# Patient Record
Sex: FEMALE | Race: WHITE | NOT HISPANIC OR LATINO | Employment: OTHER | URBAN - METROPOLITAN AREA
[De-identification: names, ages, dates, MRNs, and addresses within clinical notes are randomized per-mention and may not be internally consistent; named-entity substitution may affect disease eponyms.]

---

## 2017-09-20 NOTE — PATIENT DISCUSSION
The IOP is borderline. Pt has long hx of OHTN. Followed by Dr. Nuha Portillo in past. Has never been rx'd IOP lowering meds. VF stable from last visit. Small c/d, healthy rim OU. Monitor.

## 2018-03-27 NOTE — PATIENT DISCUSSION
The IOP is borderline. Pt has long hx of OHTN. Followed by Dr. Marla Carty in past. Has never been rx'd IOP lowering meds. VF stable from last visit. Small c/d, healthy rim OU. Monitor.

## 2018-11-03 NOTE — PATIENT DISCUSSION
The IOP is borderline. Pt has long hx of OHTN. Followed by Dr. Jennifer Valdivia in past. Has never been rx'd IOP lowering meds. Small c/d, healthy rim OU. Monitor.

## 2019-11-11 NOTE — PATIENT DISCUSSION
[place] grid at home. MVI/AREDS discussed. Patient to call if any changes in vision or grid card.
Consult with [address] [place] [address] for SLT OU.  Patient is concerned about side affects of medicine.
Cont OTC antihistamine drops as needed.
Continue current management.
Discussed AREDS 2 supplements, UV protection and green leafy vegetables.
Discussed importance of compliance with ocular medications and follow up exams to prevent loss of vision.
Glasses prescription dispensed at patient request.
Good postoperative appearance.
Instructed to call immediately if any new distortion, blurring, decreased vision or eye pain.
Monitor IOP OU.
Monitor for increased risk K edema OU.
Monitor.
No retinal holes, tears, or detachment at this time.
OCT is normal ou.
Patient happy with [facility].
Patient informed at length on side affects of glaucoma meds.
Patient understands condition, prognosis and need for follow up care.
RD/tear warning symptoms reviewed. F&F brochure given. Call immediately if increase in floaters, flashes, veil, blur.
Reassured patient.
Recommended artificial tears to use: 1 drop 4x a day in both eyes.
Recommended observation.
The IOP is above the target range.
The IOP is in the target range.
The OCT is normal ou.
VF is suspicious for glaucoma OU.
negative

## 2019-11-19 NOTE — PATIENT DISCUSSION
Patient would like to hold on SLT OU and would like to start Latanoprost OU.  Trial Lumigan qhs ou then rx latanoprost qhs ou if iop lowers.

## 2021-11-08 ENCOUNTER — NEW PATIENT EMERGENCY (OUTPATIENT)
Dept: URBAN - METROPOLITAN AREA CLINIC 36 | Facility: CLINIC | Age: 61
End: 2021-11-08

## 2021-11-08 DIAGNOSIS — H02.051: ICD-10-CM

## 2021-11-08 DIAGNOSIS — H33.001: ICD-10-CM

## 2021-11-08 PROCEDURE — 92134 CPTRZ OPH DX IMG PST SGM RTA: CPT

## 2021-11-08 PROCEDURE — 92004 COMPRE OPH EXAM NEW PT 1/>: CPT

## 2021-11-08 ASSESSMENT — TONOMETRY
OD_IOP_MMHG: 16
OS_IOP_MMHG: 16

## 2021-11-08 ASSESSMENT — VISUAL ACUITY
OS_PH: 20/30+2
OD_PH: 20/25-2
OD_SC: 20/60-1
OS_SC: 20/50-2

## 2021-12-15 ENCOUNTER — COMPREHENSIVE EXAM (OUTPATIENT)
Dept: URBAN - METROPOLITAN AREA CLINIC 36 | Facility: CLINIC | Age: 61
End: 2021-12-15

## 2021-12-15 DIAGNOSIS — H25.812: ICD-10-CM

## 2021-12-15 DIAGNOSIS — H02.051: ICD-10-CM

## 2021-12-15 DIAGNOSIS — H52.7: ICD-10-CM

## 2021-12-15 DIAGNOSIS — H33.001: ICD-10-CM

## 2021-12-15 PROCEDURE — 92015 DETERMINE REFRACTIVE STATE: CPT

## 2021-12-15 PROCEDURE — 92012 INTRM OPH EXAM EST PATIENT: CPT

## 2021-12-15 ASSESSMENT — VISUAL ACUITY
OS_SC: J1
OD_PH: 20/25
OS_PH: 20/30+1
OD_SC: 20/60
OD_SC: J2
OS_SC: 20/50

## 2021-12-15 ASSESSMENT — TONOMETRY
OD_IOP_MMHG: 12
OS_IOP_MMHG: 12

## 2022-08-30 NOTE — PROCEDURE NOTE: CLINICAL

## 2022-09-09 NOTE — PATIENT DISCUSSION
[facility]  Dermatology  [phone]    WOUND CARE INSTRUCTIONS      Begin in 24 - 48 hours    1. Cleanse wound gently with tap water and Q-tip once daily, or wash when showering/washing face using fingertips to gently cleanse area.     If there is drainage, remove as much as possible.   2. Apply Vaseline-do not let the wound dry out.  Reapply Vaseline as needed.  DO NOT LET THE WOUND DRY OUT. Do NOT let the wound scab over.   3.  Cover the wound with a clean Band-Aid or non-stick dressing, unless instructed differently.    4.  If there is any oozing or bleeding, apply firm pressure for 10-20 minutes or more. Do NOT stop holding pressure or peek under the gauze while holding pressure. Holding pressure for a solid 10-20 minutes will help form a clot to control bleeding. If you lift up too soon you can disrupt the clot forming process and start oozing again.    5. In about 10-14 days (sometimes sooner or later) you will see a sticky yellowish-whitish substance begin forming in the wound. THIS IS A NORMAL PART OF HEALING.  This is part of the inflammatory healing process and is cells and other substances the body is sending to start to close the wound.  Continue to wash the wound as normal and cover with Vaseline and a bandage.  Red dispersed areas will typically begin to appear throughout the wound base as the tissue begins to fill in from the bottom up and the outsides in.  Continue applying Vaseline and a bandage daily and DO NOT LET THE WOUND DRY OUT. Do NOT let a scab form - scabs will delay healing, can cause bleeding and more scarring.     You may shower, however reapply vaseline and bandaid.      You will be notified of your biopsy within 7-10 days.  If it has been greater then 10 days and you have not been contacted please call [phone].      Recommended topical antihistamine.

## 2022-10-11 NOTE — PROCEDURE NOTE: CLINICAL

## 2022-12-06 NOTE — PROCEDURE NOTE: CLINICAL

## 2024-08-08 ENCOUNTER — COMPREHENSIVE EXAM (OUTPATIENT)
Dept: URBAN - METROPOLITAN AREA CLINIC 43 | Facility: CLINIC | Age: 64
End: 2024-08-08

## 2024-08-08 DIAGNOSIS — H33.001: ICD-10-CM

## 2024-08-08 DIAGNOSIS — H25.812: ICD-10-CM

## 2024-08-08 DIAGNOSIS — H52.7: ICD-10-CM

## 2024-08-08 DIAGNOSIS — H01.02B: ICD-10-CM

## 2024-08-08 DIAGNOSIS — H01.02A: ICD-10-CM

## 2024-08-08 DIAGNOSIS — H04.123: ICD-10-CM

## 2024-08-08 PROCEDURE — 99214 OFFICE O/P EST MOD 30 MIN: CPT

## 2024-08-08 PROCEDURE — 92015 DETERMINE REFRACTIVE STATE: CPT

## 2024-08-08 ASSESSMENT — TONOMETRY
OS_IOP_MMHG: 16
OD_IOP_MMHG: 16

## 2024-08-08 ASSESSMENT — VISUAL ACUITY
OU_SC: J1
OD_SC: 20/30-2
OS_SC: 20/60-2
OS_SC: J1
OU_SC: 20/40-2
OS_BAT: >20/400
OD_SC: J10-

## 2024-08-13 ENCOUNTER — CONSULTATION/EVALUATION (OUTPATIENT)
Dept: URBAN - METROPOLITAN AREA CLINIC 39 | Facility: CLINIC | Age: 64
End: 2024-08-13

## 2024-08-13 DIAGNOSIS — H01.02B: ICD-10-CM

## 2024-08-13 DIAGNOSIS — H01.02A: ICD-10-CM

## 2024-08-13 DIAGNOSIS — H04.123: ICD-10-CM

## 2024-08-13 DIAGNOSIS — Z96.1: ICD-10-CM

## 2024-08-13 DIAGNOSIS — H25.812: ICD-10-CM

## 2024-08-13 DIAGNOSIS — H35.373: ICD-10-CM

## 2024-08-13 PROCEDURE — 92134 CPTRZ OPH DX IMG PST SGM RTA: CPT

## 2024-08-13 PROCEDURE — 92286 ANT SGM IMG I&R SPECLR MIC: CPT

## 2024-08-13 PROCEDURE — 92136 OPHTHALMIC BIOMETRY: CPT

## 2024-08-13 PROCEDURE — 92025CV CORNEAL TOPOGRAPHY, CV

## 2024-08-13 PROCEDURE — 99204 OFFICE O/P NEW MOD 45 MIN: CPT

## 2024-08-13 PROCEDURE — V2799PMN IMPRIMIS PRED-MOXI-NEPAF 5ML

## 2024-08-13 ASSESSMENT — VISUAL ACUITY
OU_SC: J1
OS_SC: 20/100
OD_SC: 20/20-1
OD_SC: J6
OS_SC: J2
OS_BAT: 20/400

## 2024-08-13 ASSESSMENT — TONOMETRY
OS_IOP_MMHG: 15
OD_IOP_MMHG: 13

## 2024-09-25 ENCOUNTER — PRE-OP/H&P (OUTPATIENT)
Dept: URBAN - METROPOLITAN AREA CLINIC 39 | Facility: CLINIC | Age: 64
End: 2024-09-25

## 2024-09-25 ENCOUNTER — TECH ONLY (OUTPATIENT)
Dept: URBAN - METROPOLITAN AREA CLINIC 39 | Facility: CLINIC | Age: 64
End: 2024-09-25

## 2024-09-25 ENCOUNTER — SURGERY/PROCEDURE (OUTPATIENT)
Facility: LOCATION | Age: 64
End: 2024-09-25

## 2024-09-25 DIAGNOSIS — H01.02B: ICD-10-CM

## 2024-09-25 DIAGNOSIS — H25.812: ICD-10-CM

## 2024-09-25 DIAGNOSIS — Z96.1: ICD-10-CM

## 2024-09-25 DIAGNOSIS — H04.123: ICD-10-CM

## 2024-09-25 DIAGNOSIS — H01.02A: ICD-10-CM

## 2024-09-25 DIAGNOSIS — H35.373: ICD-10-CM

## 2024-09-25 PROCEDURE — 65772LRI LRI DURING CAT SX

## 2024-09-25 PROCEDURE — 66999LNSR LENSAR LASER FOR CAT SX

## 2024-09-25 PROCEDURE — 99199PCV PROF CUSTOM VISION PACKAGE

## 2024-09-25 PROCEDURE — 99211HP PRE-OP

## 2024-09-25 PROCEDURE — 66984CV REMOVE CATARACT, INSERT LENS, CUSTOM VISION

## 2024-09-25 PROCEDURE — 99211T TECH SERVICE

## 2024-09-27 ENCOUNTER — POST-OP (OUTPATIENT)
Dept: URBAN - METROPOLITAN AREA CLINIC 43 | Facility: CLINIC | Age: 64
End: 2024-09-27

## 2024-09-27 DIAGNOSIS — Z96.1: ICD-10-CM

## 2024-09-27 PROCEDURE — 99024 POSTOP FOLLOW-UP VISIT: CPT

## 2024-10-24 ENCOUNTER — POST-OP (OUTPATIENT)
Dept: URBAN - METROPOLITAN AREA CLINIC 43 | Facility: CLINIC | Age: 64
End: 2024-10-24

## 2024-10-24 DIAGNOSIS — Z96.1: ICD-10-CM

## 2024-10-24 PROCEDURE — 99024 POSTOP FOLLOW-UP VISIT: CPT | Mod: 55

## 2024-12-25 ENCOUNTER — OFFICE VISIT (OUTPATIENT)
Dept: URGENT CARE | Age: 64
End: 2024-12-25
Payer: COMMERCIAL

## 2024-12-25 VITALS
SYSTOLIC BLOOD PRESSURE: 148 MMHG | DIASTOLIC BLOOD PRESSURE: 90 MMHG | OXYGEN SATURATION: 98 % | TEMPERATURE: 98.3 F | RESPIRATION RATE: 14 BRPM | WEIGHT: 169 LBS | HEART RATE: 98 BPM

## 2024-12-25 DIAGNOSIS — J06.9 VIRAL UPPER RESPIRATORY TRACT INFECTION: Primary | ICD-10-CM

## 2024-12-25 DIAGNOSIS — J02.9 SORE THROAT: ICD-10-CM

## 2024-12-25 LAB
POC RAPID INFLUENZA A: NEGATIVE
POC RAPID INFLUENZA B: NEGATIVE
POC RAPID STREP: NEGATIVE
POC SARS-COV-2 AG BINAX: NORMAL

## 2024-12-25 PROCEDURE — 1036F TOBACCO NON-USER: CPT | Performed by: STUDENT IN AN ORGANIZED HEALTH CARE EDUCATION/TRAINING PROGRAM

## 2024-12-25 PROCEDURE — 99203 OFFICE O/P NEW LOW 30 MIN: CPT | Performed by: STUDENT IN AN ORGANIZED HEALTH CARE EDUCATION/TRAINING PROGRAM

## 2024-12-25 PROCEDURE — 87811 SARS-COV-2 COVID19 W/OPTIC: CPT | Performed by: STUDENT IN AN ORGANIZED HEALTH CARE EDUCATION/TRAINING PROGRAM

## 2024-12-25 PROCEDURE — 87880 STREP A ASSAY W/OPTIC: CPT | Performed by: STUDENT IN AN ORGANIZED HEALTH CARE EDUCATION/TRAINING PROGRAM

## 2024-12-25 PROCEDURE — 87804 INFLUENZA ASSAY W/OPTIC: CPT | Performed by: STUDENT IN AN ORGANIZED HEALTH CARE EDUCATION/TRAINING PROGRAM

## 2024-12-25 RX ORDER — ALBUTEROL SULFATE 90 UG/1
2 INHALANT RESPIRATORY (INHALATION) EVERY 6 HOURS PRN
Qty: 18 G | Refills: 0 | Status: SHIPPED | OUTPATIENT
Start: 2024-12-25 | End: 2025-01-19

## 2024-12-25 NOTE — PROGRESS NOTES
"Subjective   Patient ID: Shirley Galeano is a 64 y.o. female. They present today with a chief complaint of Sore Throat.    History of Present Illness  Shirley is a 64-year-old female who presents to the urgent care for evaluation of sore throat that came on \"suddenly last night\".  Patient denies chest pain or shortness of breath or significant production of cough however states she does have a cough along with this.  Patient has concerns given her history of asthma and history of pneumonia and states \"she does not want this to go from her throat to her lungs\".  Patient denies associated fever, chest pain or shortness of breath otherwise.  Patient is seeking evaluation reassurance.  Patient states she has had Z-Huber's in the past for this, however denies attempting any over-the-counter remedies or treatments in the meantime.  No other symptoms or concerns otherwise reported.    Past Medical History  Allergies as of 12/25/2024 - Reviewed 12/25/2024   Allergen Reaction Noted    Sulfa (sulfonamide antibiotics) Itching, Nausea/vomiting, Unknown, and Rash 05/22/2006       (Not in a hospital admission)       No past medical history on file.    No past surgical history on file.     reports that she has never smoked. She has never used smokeless tobacco.    Review of Systems  A 10-point review of systems was performed, otherwise unremarkable unless stated in the history of present illness.                Objective    Vitals:    12/25/24 0931   BP: 148/90   Pulse: 98   Resp: 14   Temp: 36.8 °C (98.3 °F)   SpO2: 98%   Weight: 76.7 kg (169 lb)     No LMP recorded (lmp unknown). Patient is postmenopausal.    Gen: Vitals noted and reviewed, no evidence of acute distress, well developed and afebrile.   Psych: Mood and affect appropriate for setting.  Head/Face: Atraumatic and normocephalic.   Neuro: No focal deficits noted.  ENT: TMs clear bilaterally, EACs unremarkable. Mastoids non-tender. Posterior oropharynx with erythema, without " exudate, or swelling. Uvula is in the midline and non-edematous.   Neck: Supple. No meningismus through full range of motion. No lymphadenopathy.   Cardiac: Regular rate and rhythm no murmur.   Lungs: Clear to auscultation throughout, No evidence of wheezing, rhonchi or crackles. Good aeration throughout.   Extremities: Symmetrical, No peripheral edema  Skin: Without evidence of ecchymosis, wounds, or rashes.      Point of Care Test & Imaging Results from this visit  Results for orders placed or performed in visit on 12/25/24   POCT Covid-19 Rapid Antigen   Result Value Ref Range    POC BERNICE-COV-2 AG  Presumptive negative test for SARS-CoV-2 (no antigen detected)     Presumptive negative test for SARS-CoV-2 (no antigen detected)   POCT Influenza A/B manually resulted   Result Value Ref Range    POC Rapid Influenza A Negative Negative    POC Rapid Influenza B Negative Negative   POCT rapid strep A manually resulted   Result Value Ref Range    POC Rapid Strep Negative Negative      No results found.    Diagnostic study results (if any) were reviewed by Amelia Wilson DO.    Assessment/Plan   Allergies, medications, history, and pertinent labs/EKGs/Imaging reviewed by Amelia Wilson DO.     Medical Decision Making  Discussed with the patient symptoms and clinical presentation findings suggestive of a viral upper respiratory illness with pharyngitis of unclear etiology.  We advise close symptom monitoring and use of supportive treatment measures in the meantime.  We agreed to prescribe an albuterol inhaler given her history of reported asthma to aid in symptom management however advise close follow-up with primary care provider in 2 to 3 days for reassessment and definitive management. We advised seeking immediate emergency medical attention if symptoms fail to improve, worsen or any concerning symptoms arise. Patient voiced full understanding and agreement to plan.    Orders and Diagnoses  Diagnoses and all orders for  this visit:  Viral upper respiratory tract infection  -     albuterol 90 mcg/actuation inhaler; Inhale 2 puffs every 6 hours if needed for wheezing (Cough) for up to 25 days.  Sore throat  -     POCT Covid-19 Rapid Antigen  -     POCT Influenza A/B manually resulted  -     POCT rapid strep A manually resulted      Medical Admin Record      Patient disposition: Home    Electronically signed by Amelia Wilson DO  10:15 AM

## 2024-12-25 NOTE — PATIENT INSTRUCTIONS
Follow up with PCP. We advised seeking immediate emergency medical attention if symptoms fail to improve, worsen or any concerning symptoms arise. Patient voiced full understanding and agreement to plan.    
MED/SURG